# Patient Record
Sex: MALE | Employment: UNEMPLOYED | URBAN - METROPOLITAN AREA
[De-identification: names, ages, dates, MRNs, and addresses within clinical notes are randomized per-mention and may not be internally consistent; named-entity substitution may affect disease eponyms.]

---

## 2021-06-29 ENCOUNTER — TRANSFERRED RECORDS (OUTPATIENT)
Dept: HEALTH INFORMATION MANAGEMENT | Facility: CLINIC | Age: 21
End: 2021-06-29

## 2021-07-14 ENCOUNTER — TRANSFERRED RECORDS (OUTPATIENT)
Dept: HEALTH INFORMATION MANAGEMENT | Facility: CLINIC | Age: 21
End: 2021-07-14

## 2021-07-21 ENCOUNTER — TRANSFERRED RECORDS (OUTPATIENT)
Dept: HEALTH INFORMATION MANAGEMENT | Facility: CLINIC | Age: 21
End: 2021-07-21

## 2021-07-28 ENCOUNTER — TRANSFERRED RECORDS (OUTPATIENT)
Dept: HEALTH INFORMATION MANAGEMENT | Facility: CLINIC | Age: 21
End: 2021-07-28

## 2021-08-18 ENCOUNTER — MEDICAL CORRESPONDENCE (OUTPATIENT)
Dept: HEALTH INFORMATION MANAGEMENT | Facility: CLINIC | Age: 21
End: 2021-08-18

## 2021-08-19 ENCOUNTER — TRANSCRIBE ORDERS (OUTPATIENT)
Dept: OTHER | Age: 21
End: 2021-08-19

## 2021-08-19 DIAGNOSIS — G35 MS (MULTIPLE SCLEROSIS) (H): Primary | ICD-10-CM

## 2021-08-24 NOTE — TELEPHONE ENCOUNTER
RECORDS RECEIVED FROM: External   REASON FOR VISIT: MS   Date of Appt: 9/30/21   NOTES (FOR ALL VISITS) STATUS DETAILS   OFFICE NOTE from referring provider Received Dr Aisha Villatoro @ [facility]:  7/28/21 7/2/21 6/23/21   OFFICE NOTE from other specialist N/A    DISCHARGE SUMMARY from hospital N/A    DISCHARGE REPORT from the ER N/A    OPERATIVE REPORT N/A    MEDICATION LIST Received    IMAGING  (FOR ALL VISITS)     EMG N/A    EEG N/A    LUMBAR PUNCTURE Care Everywhere [facility]:  7/21/21   PIYUSH SCAN N/A    ULTRASOUND (CAROTID BILAT) *VASCULAR* N/A    MRI (HEAD, NECK, SPINE) In process [facility]:  MRI Brain 6/29/21   CT (HEAD, NECK, SPINE) N/A       Action 8/24/21 MV 2.12pm   Action Taken Imaging request faxed to [facility] for:  MRI Brain 6/29/21

## 2021-09-30 ENCOUNTER — PRE VISIT (OUTPATIENT)
Dept: NEUROLOGY | Facility: CLINIC | Age: 21
End: 2021-09-30

## 2023-07-05 ENCOUNTER — TRANSFERRED RECORDS (OUTPATIENT)
Dept: HEALTH INFORMATION MANAGEMENT | Facility: CLINIC | Age: 23
End: 2023-07-05

## 2024-02-16 ENCOUNTER — TRANSFERRED RECORDS (OUTPATIENT)
Dept: HEALTH INFORMATION MANAGEMENT | Facility: CLINIC | Age: 24
End: 2024-02-16
Payer: COMMERCIAL

## 2024-02-16 ENCOUNTER — MEDICAL CORRESPONDENCE (OUTPATIENT)
Dept: HEALTH INFORMATION MANAGEMENT | Facility: CLINIC | Age: 24
End: 2024-02-16
Payer: COMMERCIAL

## 2024-02-21 ENCOUNTER — TRANSCRIBE ORDERS (OUTPATIENT)
Dept: OTHER | Age: 24
End: 2024-02-21

## 2024-02-21 DIAGNOSIS — G35 MULTIPLE SCLEROSIS (H): Primary | ICD-10-CM

## 2024-03-08 NOTE — TELEPHONE ENCOUNTER
Action 3/8/24 MV 12.51pm   Action Taken Imaging request faxed to [facility]     Action 3/13/24 MV 10.16am   Action Taken 2nd request faxed for imaging. Only 6/29/21 image was pushed     Action 3/14/24 MV 12.35pm   Action Taken Images resolved in PACS         RECORDS RECEIVED FROM: external   REASON FOR VISIT: MS   PROVIDER: Dr. Abram Bryson   DATE OF APPT: 3/19/24   NOTES (FOR ALL VISITS) STATUS DETAILS   OFFICE NOTE from referring provider Received Dr Aisha Villatoro @ [facility]:  2/16/24 7/26/22 1/12/22 7/28/21 7/2/21 6/23/21   MEDICATION LIST Received    IMAGING  (FOR ALL VISITS)     MRI (HEAD, NECK, SPINE) PACS [facility]:  MRI Thoracic Spine 7/5/23  MRI Cervical Spine Cervical Spine 7/5/23  MRI Brain 7/5/23  MRI Thoracic Spine 7/15/22  MRI Cervical Spine 7/15/22  MRI Brain 7/15/22  MRI Brain 6/29/21

## 2024-03-19 ENCOUNTER — PRE VISIT (OUTPATIENT)
Dept: NEUROLOGY | Facility: CLINIC | Age: 24
End: 2024-03-19
Payer: COMMERCIAL

## 2024-03-19 ENCOUNTER — OFFICE VISIT (OUTPATIENT)
Dept: NEUROLOGY | Facility: CLINIC | Age: 24
End: 2024-03-19
Attending: PSYCHIATRY & NEUROLOGY
Payer: COMMERCIAL

## 2024-03-19 VITALS
OXYGEN SATURATION: 97 % | DIASTOLIC BLOOD PRESSURE: 91 MMHG | WEIGHT: 177.4 LBS | HEART RATE: 93 BPM | BODY MASS INDEX: 20.53 KG/M2 | SYSTOLIC BLOOD PRESSURE: 161 MMHG | HEIGHT: 78 IN

## 2024-03-19 DIAGNOSIS — G35 MS (MULTIPLE SCLEROSIS) (H): Primary | ICD-10-CM

## 2024-03-19 PROCEDURE — 99214 OFFICE O/P EST MOD 30 MIN: CPT | Performed by: PSYCHIATRY & NEUROLOGY

## 2024-03-19 PROCEDURE — 99417 PROLNG OP E/M EACH 15 MIN: CPT | Performed by: PSYCHIATRY & NEUROLOGY

## 2024-03-19 PROCEDURE — 99205 OFFICE O/P NEW HI 60 MIN: CPT | Performed by: PSYCHIATRY & NEUROLOGY

## 2024-03-19 RX ORDER — GLATIRAMER ACETATE 40 MG/ML
INJECTION, SOLUTION SUBCUTANEOUS
COMMUNITY
Start: 2023-03-29 | End: 2024-07-25

## 2024-03-19 RX ORDER — LEVOTHYROXINE SODIUM 137 UG/1
137 TABLET ORAL
COMMUNITY
Start: 2024-01-23

## 2024-03-19 ASSESSMENT — PAIN SCALES - GENERAL: PAINLEVEL: NO PAIN (0)

## 2024-03-19 NOTE — NURSING NOTE
Chief Complaint   Patient presents with    MS    New Patient     Establishing MS care      Vitals were taken and medications were reconciled.   Cb Boucher, EMT  12:32 PM

## 2024-03-19 NOTE — LETTER
3/19/2024       RE: Pal Minor  [address] [place] [address]     Dear Aisha,    Thank you for referring your patient, Pal Minor, to the [facility] MULTIPLE SCLEROSIS CLINIC [place] at [facility]. Please see a copy of my visit note below.    ID: Pal Minor is a 24-year-old man referred by Dr. Villatoro for neurologic consultation regarding sclerosis.    HPI: Pal had an initial attack of partial transverse myelitis in June 2021.  His left leg became numb and it spread to the trunk and the left arm.  He had workup including MRI of the brain and spine, which showed nonenhancing lesions in the corpus callosum, periventricular, and intermediate white matter as well as the left midbrain, enhancing posterior cord lesion at C3 and nonenhancing lesion at C1-2.  Spinal fluid was positive for intrathecal IgG synthesis.  He was diagnosed with MS by Dr. Villatoro and started on glatiramer acetate.  He has been clinically and radiologically stable on that, but has had some issues with immediate postinjection reactions.  The first 2 times he had some chest tightness and some nausea/vomiting, lasting only a few minutes.  The most recent one about a month ago was more severe, he had prolonged vomiting and some swelling in his lips and face.  He went to the emergency room, symptoms lasted about 90 minutes.  He is also noted in the past that he would get some axillary adenopathy on the same side after a recent abdominal injection.  He does not have any residual symptoms from MS.  Vision cognition and swallowing, sensory motor and bladder function are all doing fine.    No past medical history on file.   MS  Thyroid disease      Current Outpatient Medications:     GLATOPA 40 MG/ML injection, , Disp: , Rfl:     levothyroxine (SYNTHROID/LEVOTHROID) 137 MCG tablet, Take 137 mcg by mouth, Disp: , Rfl:      FH - no MS    SH - single, recently graduated college  "([employer] and [employer] studies), currently weighing job offers ([employer], \"[employer]").  Nonsmoker.    Exam: He is alert and oriented.  Affect bright and negatives are normal.  Cranial nerves are unremarkable.  Muscle bulk tone strength and dexterity are normal in the arms and legs.  Light touch is intact in all 4 limbs.  Finger tapping toe tapping and finger-nose-finger are normal.  Reflexes are normal and symmetric and his gait is narrow-based and stable with normal tandem walk and negative Romberg.    I reviewed medical records including neurology notes from [facility] and MRIs of the brain and spinal cord the results of which extracted above.    Impression: Lacerating multiple sclerosis, stable on glatiramer acetate but with repeated immediate postinjection reactions.  I spent 75 minutes on his care on the date of service including chart review and face-to-face time.  We discussed MS in general, including his typical presentation, course, prognosis and treatment.  We discussed the disease modifying therapies in some detail, including what would be reasonable treatment alternatives.  I told him the immediate postinjection reactions are typically not dangerous, although the facial swelling is a little bit concerning.  He did not have any wheezing or difficulty breathing.  Ultimately he was comfortable continuing to glatiramer acetate and I think that is fine for now as it seems to have done a good job controlling his MS and has an nonparallel safety profile.  He requested to continue his neurologic care here and we are happy to accommodate.    Plan: Follow-up in 6 months with repeat MRI brain.  Contact information was given to him.    Thank you for allow me to participate in his care.    This note was completed in part using voice-recognition software, and some typographic errors may be present as a result.        Again, thank you for allowing me to participate in the care of your patient.  "     Sincerely,    Abram Bryson MD

## 2024-03-22 PROBLEM — G35 MS (MULTIPLE SCLEROSIS) (H): Status: ACTIVE | Noted: 2024-03-22

## 2024-03-22 NOTE — PROGRESS NOTES
"ID: Pal Minor is a 24-year-old man referred by Dr. Villatoro for neurologic consultation regarding sclerosis.    HPI: Pal had an initial attack of partial transverse myelitis in June 2021.  His left leg became numb and it spread to the trunk and the left arm.  He had workup including MRI of the brain and spine, which showed nonenhancing lesions in the corpus callosum, periventricular, and intermediate white matter as well as the left midbrain, enhancing posterior cord lesion at C3 and nonenhancing lesion at C1-2.  Spinal fluid was positive for intrathecal IgG synthesis.  He was diagnosed with MS by Dr. Villatoro and started on glatiramer acetate.  He has been clinically and radiologically stable on that, but has had some issues with immediate postinjection reactions.  The first 2 times he had some chest tightness and some nausea/vomiting, lasting only a few minutes.  The most recent one about a month ago was more severe, he had prolonged vomiting and some swelling in his lips and face.  He went to the emergency room, symptoms lasted about 90 minutes.  He is also noted in the past that he would get some axillary adenopathy on the same side after a recent abdominal injection.  He does not have any residual symptoms from MS.  Vision cognition and swallowing, sensory motor and bladder function are all doing fine.    No past medical history on file.   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  Nonsmoker.    Exam: He is alert and oriented.  Affect bright and negatives are normal.  Cranial nerves are unremarkable.  Muscle bulk tone strength and dexterity are normal in the arms and legs.  Light touch is intact in all 4 " limbs.  Finger tapping toe tapping and finger-nose-finger are normal.  Reflexes are normal and symmetric and his gait is narrow-based and stable with normal tandem walk and negative Romberg.    I reviewed medical records including neurology notes from [facility] and MRIs of the brain and spinal cord the results of which extracted above.    Impression: Relapsing remitting multiple sclerosis, stable on glatiramer acetate but with repeated immediate postinjection reactions.  I spent 75 minutes on his care on the date of service including chart review and face-to-face time.  We discussed MS in general, including his typical presentation, course, prognosis and treatment.  We discussed the disease modifying therapies in some detail, including what would be reasonable treatment alternatives.  I told him the immediate postinjection reactions are typically not dangerous, although the facial swelling is a little bit concerning.  He did not have any wheezing or difficulty breathing.  Ultimately he was comfortable continuing to glatiramer acetate and I think that is fine for now as it seems to have done a good job controlling his MS and has an nonparallel safety profile.  He requested to continue his neurologic care here and we are happy to accommodate.    Plan: Follow-up in 6 months with repeat MRI brain.  Contact information was given to him.    Thank you for allow me to participate in his care.    This note was completed in part using voice-recognition software, and some typographic errors may be present as a result.

## 2024-07-16 ENCOUNTER — OFFICE VISIT (OUTPATIENT)
Dept: NEUROLOGY | Facility: CLINIC | Age: 24
End: 2024-07-16
Attending: PSYCHIATRY & NEUROLOGY
Payer: COMMERCIAL

## 2024-07-16 VITALS
HEART RATE: 80 BPM | BODY MASS INDEX: 19.68 KG/M2 | DIASTOLIC BLOOD PRESSURE: 91 MMHG | SYSTOLIC BLOOD PRESSURE: 163 MMHG | OXYGEN SATURATION: 99 % | WEIGHT: 170.3 LBS

## 2024-07-16 DIAGNOSIS — T50.905A ADVERSE EFFECT OF DRUG, INITIAL ENCOUNTER: ICD-10-CM

## 2024-07-16 DIAGNOSIS — G35 MS (MULTIPLE SCLEROSIS) (H): Primary | ICD-10-CM

## 2024-07-16 PROCEDURE — 99215 OFFICE O/P EST HI 40 MIN: CPT | Performed by: PSYCHIATRY & NEUROLOGY

## 2024-07-16 PROCEDURE — 99214 OFFICE O/P EST MOD 30 MIN: CPT | Performed by: PSYCHIATRY & NEUROLOGY

## 2024-07-16 ASSESSMENT — PAIN SCALES - GENERAL: PAINLEVEL: NO PAIN (0)

## 2024-07-16 NOTE — PROGRESS NOTES
ID: Pal Minor is a 24-year-old man who I have seen once previously in March for multiple sclerosis.  He returns early to discuss immediate postinjection reactions with glatiramer acetate.    HPI: Pal called in June after experiencing yet another atypical postinjection reaction, with nausea and vomiting, facial swelling, and hives.  This again occurred after injecting in the abdomen, and since then he has been limiting the injections to the hips and thighs and has had no further reactions.  His history dates back to an initial attack of partial transverse myelitis in June 2021, with numbness in the left leg arm and trunk.  He has been on glatiramer acetate since that time.  This visit was to discuss potential treatment change.  He has been neurologically stable.  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    No past medical history on file.   MS      Current Outpatient Medications:     cholecalciferol (VITAMIN D3) 125 mcg (5000 units) capsule, Take by mouth daily, Disp: , Rfl:     GLATOPA 40 MG/ML injection, , Disp: , Rfl:     levothyroxine (SYNTHROID/LEVOTHROID) 137 MCG tablet, Take 137 mcg by mouth, Disp: , Rfl:      No exam was done today as the entire visit was spent in counseling and coordination of care.    Impression: Relapsing remitting multiple sclerosis, clinically stable on glatiramer but with repeated atypical and severe postinjection reactions.  I spent 41 minutes on his care today including chart review face-to-face and documentation time.  We reviewed MS immunotherapy options including the interferon beta preparations, teriflunomide, the fumarate preparations, and B-cell depleting agents.  We also discussed what is and is not known about immediate postinjection reactions.  I have no explanation as to why these would be triggered by injections only in the abdomen, but  I have had other patients describe getting such reactions only with injecting into certain sites.  1 option would be just to continue to avoid the abdominal injections.  We also discussed teriflunomide, dimethyl fumarate, and Avonex or Plegridy as reasonable options.  Ultimately he decided to keep going with the glatiramer acetate for now.    Plan: Follow-up in September as previously arranged.    This note was completed in part using voice-recognition software, and some typographic errors may be present as a result.

## 2024-07-16 NOTE — NURSING NOTE
Chief Complaint   Patient presents with    MS    RECHECK     MS follow up      Vitals were taken and medications were reconciled.   Cb Boucher, EMT  11:26 AM

## 2024-07-16 NOTE — LETTER
7/16/2024       RE: Pal Minor  [address] [place] [address]     Dear Colleague,    Thank you for referring your patient, Pal Minor, to the [facility] MULTIPLE SCLEROSIS CLINIC [place] at [facility]. Please see a copy of my visit note below.    ID: Pal Minor is a 24-year-old man who I have seen once previously in March for multiple sclerosis.  He returns early to discuss immediate postinjection reactions with glatiramer acetate.    HPI: Pal called in June after experiencing yet another atypical postinjection reaction, with nausea and vomiting, facial swelling, and hives.  This again occurred after injecting in the abdomen, and since then he has been limiting the injections to the hips and thighs and has had no further reactions.  His history dates back to an initial attack of partial transverse myelitis in June 2021, with numbness in the left leg arm and trunk.  He has been on glatiramer acetate since that time.  This visit was to discuss potential treatment change.  He has been neurologically stable.  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    No past medical history on file.   MS      Current Outpatient Medications:      cholecalciferol (VITAMIN D3) 125 mcg (5000 units) capsule, Take by mouth daily, Disp: , Rfl:      GLATOPA 40 MG/ML injection, , Disp: , Rfl:      levothyroxine (SYNTHROID/LEVOTHROID) 137 MCG tablet, Take 137 mcg by mouth, Disp: , Rfl:      No exam was done today as the entire visit was spent in counseling and coordination of care.    Impression: Relapsing remitting multiple sclerosis, clinically stable on glatiramer but with repeated atypical and severe postinjection reactions.  I spent 41 minutes on his care today including chart review face-to-face and documentation time.  We  reviewed MS immunotherapy options including the interferon beta preparations, teriflunomide, the fumarate preparations, and B-cell depleting agents.  We also discussed what is and is not known about immediate postinjection reactions.  I have no explanation as to why these would be triggered by injections only in the abdomen, but I have had other patients describe getting such reactions only with injecting into certain sites.  1 option would be just to continue to avoid the abdominal injections.  We also discussed teriflunomide, dimethyl fumarate, and Avonex or Plegridy as reasonable options.  Ultimately he decided to keep going with the glatiramer acetate for now.    Plan: Follow-up in September as previously arranged.    This note was completed in part using voice-recognition software, and some typographic errors may be present as a result.       Again, thank you for allowing me to participate in the care of your patient.      Sincerely,    Abram Bryson MD

## 2024-09-10 ENCOUNTER — OFFICE VISIT (OUTPATIENT)
Dept: NEUROLOGY | Facility: CLINIC | Age: 24
End: 2024-09-10
Attending: PSYCHIATRY & NEUROLOGY
Payer: COMMERCIAL

## 2024-09-10 ENCOUNTER — ANCILLARY PROCEDURE (OUTPATIENT)
Dept: MRI IMAGING | Facility: CLINIC | Age: 24
End: 2024-09-10
Attending: PSYCHIATRY & NEUROLOGY
Payer: COMMERCIAL

## 2024-09-10 VITALS
DIASTOLIC BLOOD PRESSURE: 90 MMHG | BODY MASS INDEX: 20.19 KG/M2 | WEIGHT: 174.7 LBS | SYSTOLIC BLOOD PRESSURE: 144 MMHG | HEART RATE: 59 BPM | OXYGEN SATURATION: 97 %

## 2024-09-10 DIAGNOSIS — G35 MS (MULTIPLE SCLEROSIS) (H): ICD-10-CM

## 2024-09-10 DIAGNOSIS — G35 MS (MULTIPLE SCLEROSIS) (H): Primary | ICD-10-CM

## 2024-09-10 PROCEDURE — 99214 OFFICE O/P EST MOD 30 MIN: CPT | Performed by: PSYCHIATRY & NEUROLOGY

## 2024-09-10 PROCEDURE — G2211 COMPLEX E/M VISIT ADD ON: HCPCS | Performed by: PSYCHIATRY & NEUROLOGY

## 2024-09-10 PROCEDURE — A9585 GADOBUTROL INJECTION: HCPCS | Mod: JZ | Performed by: RADIOLOGY

## 2024-09-10 PROCEDURE — 99213 OFFICE O/P EST LOW 20 MIN: CPT | Performed by: PSYCHIATRY & NEUROLOGY

## 2024-09-10 PROCEDURE — 70553 MRI BRAIN STEM W/O & W/DYE: CPT | Performed by: RADIOLOGY

## 2024-09-10 RX ORDER — GADOBUTROL 604.72 MG/ML
7.5 INJECTION INTRAVENOUS ONCE
Status: COMPLETED | OUTPATIENT
Start: 2024-09-10 | End: 2024-09-10

## 2024-09-10 RX ADMIN — GADOBUTROL 7.5 ML: 604.72 INJECTION INTRAVENOUS at 12:25

## 2024-09-10 ASSESSMENT — PAIN SCALES - GENERAL: PAINLEVEL: NO PAIN (0)

## 2024-09-10 NOTE — LETTER
9/10/2024       RE: Pal Minor  [address] [place] [address]     Dear Colleague,    Thank you for referring your patient, Pal Minor, to the [facility] MULTIPLE SCLEROSIS CLINIC [place] at [facility]. Please see a copy of my visit note below.    ID: Pal Minor is a 24-year-old man who I follow for multiple sclerosis.  He returns for routine follow-up and for MRI review.  I last saw him in July of this year.    HPI: Pal has been neurologically stable, with no new symptoms suggestive of interim relapse or disease progression.  He has no residual symptoms from MS.  Onset was in 2021 with numbness of the left limbs and trunk.  He has had issues with atypical immediate postinjection reactions with his glatiramer acetate, typically when he injects in the anterior abdomen.  These involve nausea vomiting and facial swelling as well as the more typical palpitations.  He has not had any more of those since I saw him last, has been injecting in the thighs hips and lateral abdomen and avoiding the anterior stomach.  He is still searching for a job, basically considering the whole world for a location.    We reviewed his MRI of the brain done earlier today and compared it to the prior exam from July 2023.  It again shows a low burden of typical white matter lesions.  I wondered if there might be a new right anterior periventricular lesion (series 2001, image 73), but it is difficult to tell as the current exam is higher resolution and it may have just been skipped over on the prior MRI.  Radiology felt the MRI was stable.  There were no enhancing lesions.    No past medical history on file.   MS      Current Outpatient Medications:      cholecalciferol (VITAMIN D3) 125 mcg (5000 units) capsule, Take by mouth daily, Disp: , Rfl:      GLATOPA 40 MG/ML injection, Inject 40 mg subcutaneously three times a week, Disp: 12 mL, Rfl: 11     levothyroxine  (SYNTHROID/LEVOTHROID) 137 MCG tablet, Take 137 mcg by mouth, Disp: , Rfl:      Exam: He is alert and oriented.  Affect is bright and language functions are normal.  Cranial nerves are unremarkable.  Muscle bulk, tone, strength and dexterity are normal in the arms and legs.  Light touch is intact in all 4 limbs.  Reflexes are normal and symmetric and his gait is narrow-based and stable.    Impression: Relapsing remitting multiple sclerosis, clinically and radiologically stable on glatiramer acetate.  I spent 23 minutes on his care on the date of service including chart review and face-to-face time.  We discussed the possible new lesion, even if it was definitely new I told him I would not consider this an indication for treatment change at this time.  We will continue with clinical and MRI surveillance.  I asked him to let me know if and when he will be moving away for a job and if I am able to I will try to help identify an appropriate provider in his new area.    The longitudinal plan of care for the diagnosis(es)/condition(s) as documented were addressed during this visit. Due to the added complexity in care, I will continue to support Pal in the subsequent management and with ongoing continuity of care.    Plan: Follow-up in 1 year.  Plan to repeat brain MRI in 2 years.    This note was completed in part using voice-recognition software, and some typographic errors may be present as a result.       Again, thank you for allowing me to participate in the care of your patient.      Sincerely,    Abram Bryson MD

## 2024-09-10 NOTE — DISCHARGE INSTRUCTIONS
MRI Contrast Discharge Instructions    The IV contrast you received today will pass out of your body in your  urine. This will happen in the next 24 hours. You will not feel this process.  Your urine will not change color.    Drink at least 4 extra glasses of water or juice today (unless your doctor  has restricted your fluids). This reduces the stress on your kidneys.  You may take your regular medicines.    If you are on dialysis: It is best to have dialysis today.    If you have a reaction: Most reactions happen right away. If you have  any new symptoms after leaving the hospital (such as hives or swelling),  call your hospital at the correct number below. Or call your family doctor.  If you have breathing distress or wheezing, call 911.    Special instructions: ***    I have read and understand the above information.    Signature:______________________________________ Date:___________    Staff:__________________________________________ Date:___________     Time:__________    [place] [facility] Departments:    ___[place]: [phone]  ___[facility]: [phone]  ___[place]: [phone] ___[facility]: [phone]  ___[facility]: [phone]  ___[facility]: [phone]  ___[phone]  ___[facility]: [phone]  ___[phone]

## 2024-09-10 NOTE — NURSING NOTE
Chief Complaint   Patient presents with    MS    RECHECK     6 month follow up      Vitals were taken and medications were reconciled.   Cb Boucher, EMT  1:18 PM

## 2024-09-13 NOTE — PROGRESS NOTES
ID: Pal Minor is a 24-year-old man who I follow for multiple sclerosis.  He returns for routine follow-up and for MRI review.  I last saw him in July of this year.    HPI: Pal has been neurologically stable, with no new symptoms suggestive of interim relapse or disease progression.  He has no residual symptoms from MS.  Onset was in 2021 with numbness of the left limbs and trunk.  He has had issues with atypical immediate postinjection reactions with his glatiramer acetate, typically when he injects in the anterior abdomen.  These involve nausea vomiting and facial swelling as well as the more typical palpitations.  He has not had any more of those since I saw him last, has been injecting in the thighs hips and lateral abdomen and avoiding the anterior stomach.  He is still searching for a job, basically considering the whole world for a location.    We reviewed his MRI of the brain done earlier today and compared it to the prior exam from July 2023.  It again shows a low burden of typical white matter lesions.  I wondered if there might be a new right anterior periventricular lesion (series 2001, image 73), but it is difficult to tell as the current exam is higher resolution and it may have just been skipped over on the prior MRI.  Radiology felt the MRI was stable.  There were no enhancing lesions.    No past medical history on file.   MS      Current Outpatient Medications:     cholecalciferol (VITAMIN D3) 125 mcg (5000 units) capsule, Take by mouth daily, Disp: , Rfl:     GLATOPA 40 MG/ML injection, Inject 40 mg subcutaneously three times a week, Disp: 12 mL, Rfl: 11    levothyroxine (SYNTHROID/LEVOTHROID) 137 MCG tablet, Take 137 mcg by mouth, Disp: , Rfl:      Exam: He is alert and oriented.  Affect is bright and language functions are normal.  Cranial nerves are unremarkable.  Muscle bulk, tone, strength and dexterity are normal in the arms and legs.  Light touch is intact in all 4 limbs.  Reflexes are  normal and symmetric and his gait is narrow-based and stable.    Impression: Relapsing remitting multiple sclerosis, clinically and radiologically stable on glatiramer acetate.  I spent 23 minutes on his care on the date of service including chart review and face-to-face time.  We discussed the possible new lesion, even if it was definitely new I told him I would not consider this an indication for treatment change at this time.  We will continue with clinical and MRI surveillance.  I asked him to let me know if and when he will be moving away for a job and if I am able to I will try to help identify an appropriate provider in his new area.    The longitudinal plan of care for the diagnosis(es)/condition(s) as documented were addressed during this visit. Due to the added complexity in care, I will continue to support Pal in the subsequent management and with ongoing continuity of care.    Plan: Follow-up in 1 year.  Plan to repeat brain MRI in 2 years.    This note was completed in part using voice-recognition software, and some typographic errors may be present as a result.

## 2024-10-22 ENCOUNTER — TELEPHONE (OUTPATIENT)
Dept: NEUROLOGY | Facility: CLINIC | Age: 24
End: 2024-10-22
Payer: COMMERCIAL

## 2024-10-22 NOTE — TELEPHONE ENCOUNTER
Prior Authorization Specialty Medication Request    Medication/Dose: Plegridy subcutaneous pen starter pack (63 mcg on Day 1 and 94 mcg on Day 14), and maintenance rx Plegridy 125 mcg subcutaneous pen (one pen every 14 days)  Diagnosis and ICD code (if different than what is on RX):  Multiple sclerosis, G35  New/renewal/insurance change PA/secondary ins. PA: New  Previously Tried and Failed:  Glatiramer acetate    Important Lab Values:   Rationale: Initiation of alternate disease-modifying therapy for demyelinating disease (multiple sclerosis). Hives, vomiting, facial swelling with glatiramer acetate.    Insurance   Primary: [employer]  Insurance ID:  [id number]    Secondary (if applicable):  Insurance ID:      Pharmacy Information (if different than what is on RX)  Name:    Phone:    Fax:    Clinic Information  Preferred routing pool for dept communication: Multiple Sclerosis Nursing Pool

## 2024-10-22 NOTE — TELEPHONE ENCOUNTER
Prior Authorization Approval    Medication: PLEGRIDY STARTER PACK 63 & 94 MCG/0.5ML SC SOAJ  Authorization Effective Date: 10/22/2024  Authorization Expiration Date: 10/22/2025  Approved Dose/Quantity: 1mL/28 days  Reference #:     Insurance Company: [employer] - Phone [phone] Fax [phone]  Expected CoPay: $    CoPay Card Available:      Financial Assistance Needed:   Which Pharmacy is filling the prescription: [address]  Pharmacy Notified: Yes  Patient Notified: Yes        Thank you,    Kaye Odell [facility]  Specialty Pharmacy Clinic Liaison - CardiologyNeurologyMultiple Sclerosis  [facility] [address] [address]  Ph: [phone] Fax: [phone]  [email or web address]

## 2024-10-24 NOTE — TELEPHONE ENCOUNTER
Plegridy start form signed by pt and placed in Dr Bryson's folder for signature. Pt has MTM visit scheduled for Tuesday 10/29.    Jessica Pantoja RN     [phone]

## 2024-10-29 ENCOUNTER — VIRTUAL VISIT (OUTPATIENT)
Dept: PHARMACY | Facility: CLINIC | Age: 24
End: 2024-10-29
Attending: PSYCHIATRY & NEUROLOGY
Payer: COMMERCIAL

## 2024-10-29 DIAGNOSIS — G35 MS (MULTIPLE SCLEROSIS) (H): Primary | ICD-10-CM

## 2024-10-29 DIAGNOSIS — E03.9 HYPOTHYROIDISM: ICD-10-CM

## 2024-10-29 RX ORDER — PEGINTERFERON BETA-1A 125 UG/.5ML
125 INJECTION, SOLUTION SUBCUTANEOUS
Qty: 1 ML | Refills: 11 | Status: SHIPPED | OUTPATIENT
Start: 2024-10-29

## 2024-10-29 RX ORDER — PEGINTERFERON BETA-1A 63-94 MCG
KIT SUBCUTANEOUS
Qty: 1 ML | Refills: 0 | Status: SHIPPED | OUTPATIENT
Start: 2024-10-29

## 2024-10-29 NOTE — PATIENT INSTRUCTIONS
"Recommendations from today's MTM visit:                                                    Today we reviewed what your medicines are for, how to know if they are working, that your medicines are safe and how to make your medicine regimen as easy as possible.      START Plegridy. Prescriptions for the starter dose and maintenance dosing have been sent to [facility] pharmacy. It looks like your insurance covers auto injectors.   The auto injector is a subcutaneous injection that can be injected into your stomach, upper arm, or thigh. Try to rotate your injection sites  Here is a great website to review when you do your first injection: https://www.plegridyRegalii/en_us/home/starting-plegridy/how-to-take-plegridy.html  First months dosin mcg on day 1 and 94 mcg on day 15 (two weeks apart  Maintenance dosin mcg every 2 weeks (this will start two weeks after the 94 mcg dose)  Possible side effects include injection site reaction, pain where medication is injection, and flu like symptoms   Flu like symptoms: headache, low grade fever, chills, muscle and body aches, and headache   To help with these symptoms you can take ibuprofen (Motrin) 200mg every 6 hours as needed OR naproxen (Aleve) 220mg every 12 hours as needed AND acetaminophen (Tylenol) 325mg every 6 hours as needed  Plegridy should be stored in the refrigerator until it is time to use. You can let the medication warm up for about 30 minutes prior to the injection on your counter   Please purchase a sharps container to dispose of your auto injectors.     Follow-up: 2025 at 11:00 AM via phone call.     It was great speaking with you today.  I value your experience and would be very thankful for your time in providing feedback in our clinic survey. In the next few days, you may receive an email or text message from [employer] with a link to a survey related to your  clinical pharmacist.\"     To schedule another MTM appointment, please call the " clinic directly or you may call the [facility] scheduling line at [phone] or toll-free at [phone].     My Clinical Pharmacist's contact information:                                                      Please feel free to contact me with any questions or concerns you have.      Josiane Altamirano, PharmD, [facility]  Medication Therapy Management Pharmacist   [facility]

## 2024-10-29 NOTE — TELEPHONE ENCOUNTER
Start form faxed to pharmacy liaison. Pt completed MTM visit today and prescriptions were entered to go to [facility].    Jessica Pantoja RN

## 2024-10-29 NOTE — Clinical Note
Dr. Bradyon MCDONNELL [facility] appointment completed today for Plegridy. Prescriptions sent to pharmacy. Thanks!

## 2024-10-29 NOTE — PROGRESS NOTES
Medication Therapy Management (MTM) Encounter    ASSESSMENT:                            Medication Adherence/Access: No issues identified.    MS:   Patient has had reactions to glatiramer acetate and will be switching to another MS disease modifying therapy. Patient may benefit from starting Plegridy. Baseline labs reviewed. All patient's questions were answered. Education provided to the patient on medication dosing, medication administration, storage, and disposal, and potential side effects.      Hypothyroidism   Labs up to date and within normal limits. Patient is stable on current levothyroxine dose, no changes recommended today.     PLAN:                            START Plegridy. Prescriptions for the starter dose and maintenance dosing have been sent to [facility] pharmacy. It looks like your insurance covers auto injectors.   The auto injector is a subcutaneous injection that can be injected into your stomach, upper arm, or thigh. Try to rotate your injection sites  Here is a great website to review when you do your first injection: https://www.[employer]/en_us/home/starting-plegridy/how-to-take-plegridy.html  First months dosin mcg on day 1 and 94 mcg on day 15 (two weeks apart  Maintenance dosin mcg every 2 weeks (this will start two weeks after the 94 mcg dose)  Possible side effects include injection site reaction, pain where medication is injection, and flu like symptoms   Flu like symptoms: headache, low grade fever, chills, muscle and body aches, and headache   To help with these symptoms you can take ibuprofen (Motrin) 200mg every 6 hours as needed OR naproxen (Aleve) 220mg every 12 hours as needed AND acetaminophen (Tylenol) 325mg every 6 hours as needed  Plegridy should be stored in the refrigerator until it is time to use. You can let the medication warm up for about 30 minutes prior to the injection on your counter   Please purchase a sharps container to dispose of your auto  injectors.     Follow-up: 1/28/2025 at 11:00 AM via phone call.     SUBJECTIVE/OBJECTIVE:                          Pal Minor is a 24 year old male seen for an initial visit. He was referred to me from Dr. Bryson.      Reason for visit: Discuss Plegridy .    Allergies/ADRs: Reviewed in chart  Past Medical History: Reviewed in chart  Tobacco: He reports that he has never smoked. He has never used smokeless tobacco.  Alcohol: Less than 1 beverage / month    Medication Adherence/Access: no issues reported.      MS:   - Vitamin D 5000 units once daily      Patient is wanting to know more about Plegridy. He was injecting glatiramer for the past 3 years but started to develop injection site reactions. He was experiencing hives, rash on head, neck and back, nausea, vomiting, facial swelling, and decreased hearing because of ear swelling. He was advised to stop medication and consider starting an alternative agent. After reviewed some of the medications, he decided to start Plegridy. A prior authorization for the medication has already been completed and approved to be filled through [facility] specialty pharmacy.    Baseline screenings:   CBC with differential completed   Hepatic panel completed     Disease Modifying Therapy History:   - glatiramer acetate 2021- October 2024   Last injection was about 2 weeks ago     Hypothyroidism   Levothyroxine 137 mcg daily.   Patient is having the following symptoms: none.   Component  Ref Range & Units 9 mo ago   TSH  0.27 - 4.20 uIU/mL 2.06          Today's Vitals: There were no vitals taken for this visit.  ----------------    I spent 33 minutes with this patient today. All changes were made via collaborative practice agreement with Abram Bryson. A copy of the visit note was provided to the patient's provider(s).    A summary of these recommendations was sent via [employer].    Josiane Altamirano, PharmD, BCACP  Medication Therapy Management Pharmacist   [facility]  Neurology    Telemedicine Visit Details  The patient's medications can be safely assessed via a telemedicine encounter.  Type of service:  Telephone visit  Originating Location (pt. Location): Home  Distant Location (provider location):  Off-site  Start Time: 11:00 AM  End Time: 11:33 AM     Medication Therapy Recommendations  MS (multiple sclerosis) (H)   1 Current Medication: GLATOPA 40 MG/ML injection (Discontinued)   Current Medication Sig: Inject 40 mg subcutaneously three times a week   Rationale: Allergic reaction - Adverse medication event - Safety   Recommendation: Change Medication - Plegridy 125 MCG/0.5ML Sosy   Status: Accepted per CPA   Identified Date: 10/29/2024 Completed Date: 10/29/2024

## 2024-11-12 ENCOUNTER — TELEPHONE (OUTPATIENT)
Dept: NEUROLOGY | Facility: CLINIC | Age: 24
End: 2024-11-12
Payer: COMMERCIAL

## 2024-11-12 NOTE — TELEPHONE ENCOUNTER
PA Initiation    Medication: PLEGRIDY 125 MCG/0.5ML IM SOSY  Insurance Company: [employer] - Phone [phone] Fax [phone]  Pharmacy Filling the Rx: [place], [place] - [address] [place] [address]  Filling Pharmacy Phone:    Filling Pharmacy Fax:    Start Date: 11/12/2024            Thank you,    Kaye Odell [facility]  Specialty Pharmacy Clinic Liaison - CardiologyNeurologyMultiple [facility] [address]  [address] [place] [address]  Ph: [phone] Fax: [phone]  [email or web address]

## 2024-11-19 NOTE — TELEPHONE ENCOUNTER
Prior Authorization Approval    Medication: PLEGRIDY 125 MCG/0.5ML IM SOSY  Authorization Effective Date: 10/22/2024  Authorization Expiration Date: 10/22/2025  Approved Dose/Quantity: 28 days  Reference #:     Insurance Company: [employer] - Phone [phone] Fax [phone]  Expected CoPay: $    CoPay Card Available:      Financial Assistance Needed:   Which Pharmacy is filling the prescription: [address]  Pharmacy Notified: Yes  Patient Notified: Yes          Thank you,    Kaye Odell [facility]  Specialty Pharmacy Clinic Liaison - CardiologyNeurologyMultiple Sclerosis  [facility] [place] [address]  Ph: [phone] Fax: [phone]  [email or web address]

## 2024-12-14 ENCOUNTER — HEALTH MAINTENANCE LETTER (OUTPATIENT)
Age: 24
End: 2024-12-14

## 2024-12-20 ENCOUNTER — TELEPHONE (OUTPATIENT)
Dept: NEUROLOGY | Facility: CLINIC | Age: 24
End: 2024-12-20
Payer: COMMERCIAL

## 2024-12-20 DIAGNOSIS — G35 MS (MULTIPLE SCLEROSIS) (H): ICD-10-CM

## 2024-12-20 NOTE — TELEPHONE ENCOUNTER
PA Initiation    Medication: PLEGRIDY 125 MCG/0.5ML SC SOAJ  Insurance Company: [employer] Prior Auth Dept, phone  [phone], Fax [phone]  Pharmacy Filling the Rx: [facility] #[id number] - [place], [place] - [address]  Filling Pharmacy Phone:    Filling Pharmacy Fax:    Start Date: 12/20/2024       Urgent PA submitted and approved. Awaiting approval letter from [employer] [facility] with effective dates. His preferred specialty pharmacy is [facility].     Thank you,    Kaye Odell [facility]  Specialty Pharmacy Clinic Liaison - CardiologyNeurologyMultLakes Medical Center [facility] [address]  3rd [place] [address]  Ph: [phone] Fax: [phone]  [email or web address]

## 2024-12-20 NOTE — TELEPHONE ENCOUNTER
Prior Authorization Specialty Medication Request    Medication/Dose: Plegridy pen 125 mcg (maintenance dose)  Diagnosis and ICD code (if different than what is on RX):  Multiple sclerosis, G35  New/renewal/insurance change PA/secondary ins. PA: NEW INSURANCE  Previously Tried and Failed:  Glatiramer acetate.     Important Lab Values:   Rationale: Continuation of disease-modifying therapy for demyelinating disease. Currently on Plegridy, has completed starter pack and now due for maintenance dose. New insurance.    Insurance   Primary: [employer]  Insurance ID:  [id number] (Group [id number], policy allen Marlo Darnellbri)    Secondary (if applicable):  Insurance ID:      Pharmacy Information (if different than what is on RX)  Name:  [facility]  Phone:    Fax:    Clinic Information  Preferred routing pool for dept communication: Multiple Sclerosis Nursing Pool

## 2024-12-20 NOTE — TELEPHONE ENCOUNTER
Prior Authorization Approval    Medication: PLEGRIDY 125 MCG/0.5ML SC SOAJ  Authorization Effective Date: 12/20/2024  Authorization Expiration Date: 12/20/2025  Approved Dose/Quantity: 28 days  Reference #: CMM KEY: [id number]   Insurance Company: [employer] Prior Auth Dept, phone  [phone], Fax [phone]  Expected CoPay: $    CoPay Card Available:      Financial Assistance Needed:   Which Pharmacy is filling the prescription: [facility] #[id number] - [address]  Pharmacy Notified: Yes  Patient Notified: Yes          Thank you,    Kaye Odell [facility]  Specialty Pharmacy Clinic Liaison - CardiologyNeurologyMultiple Sclerosis  [facility] [address]  [address] [place] [address]  Ph: [phone] Fax: [phone]  [email or web address]

## 2024-12-23 RX ORDER — PEGINTERFERON BETA-1A 125 UG/.5ML
125 INJECTION, SOLUTION SUBCUTANEOUS
Qty: 1 ML | Refills: 11 | Status: SHIPPED | OUTPATIENT
Start: 2024-12-23 | End: 2024-12-24

## 2024-12-23 NOTE — TELEPHONE ENCOUNTER
Plegridy now to be filled at [facility]. Patient was last seen in September and has follow up appointment in January with Dr. Bryson. Refilled per MS refill protocol.    Floridalma Pride RN

## 2025-01-14 ENCOUNTER — OFFICE VISIT (OUTPATIENT)
Dept: NEUROLOGY | Facility: CLINIC | Age: 25
End: 2025-01-14
Attending: PSYCHIATRY & NEUROLOGY
Payer: COMMERCIAL

## 2025-01-14 VITALS
SYSTOLIC BLOOD PRESSURE: 162 MMHG | OXYGEN SATURATION: 98 % | DIASTOLIC BLOOD PRESSURE: 90 MMHG | WEIGHT: 175.3 LBS | HEART RATE: 78 BPM | BODY MASS INDEX: 20.28 KG/M2 | HEIGHT: 78 IN

## 2025-01-14 DIAGNOSIS — G35 MS (MULTIPLE SCLEROSIS) (H): Primary | ICD-10-CM

## 2025-01-14 ASSESSMENT — PAIN SCALES - GENERAL: PAINLEVEL_OUTOF10: NO PAIN (0)

## 2025-01-14 NOTE — NURSING NOTE
Chief Complaint   Patient presents with    MS    RECHECK     MS follow up      Vitals were taken and medications were reconciled.    Cb Boucher, EMT  12:55 PM

## 2025-01-28 ENCOUNTER — VIRTUAL VISIT (OUTPATIENT)
Dept: PHARMACY | Facility: CLINIC | Age: 25
End: 2025-01-28
Attending: PSYCHIATRY & NEUROLOGY

## 2025-01-28 DIAGNOSIS — G35 MS (MULTIPLE SCLEROSIS) (H): ICD-10-CM

## 2025-01-28 RX ORDER — ACETAMINOPHEN 500 MG
1000 TABLET ORAL EVERY 6 HOURS PRN
COMMUNITY

## 2025-01-28 RX ORDER — PEGINTERFERON BETA-1A 125 UG/.5ML
125 INJECTION, SOLUTION SUBCUTANEOUS
Qty: 1 ML | Refills: 11 | Status: SHIPPED | OUTPATIENT
Start: 2025-01-28

## 2025-01-28 NOTE — PATIENT INSTRUCTIONS
"Recommendations from today's MTM visit:                                                       Continue with Plegridy 125mcg inject subcutaneous every 14 days   Labs on March 11th     Follow-up: 8/1/2025 at 11:00 AM via phone call     It was great speaking with you today.  I value your experience and would be very thankful for your time in providing feedback in our clinic survey. In the next few days, you may receive an email or text message from [employer] with a link to a survey related to your  clinical pharmacist.\"     To schedule another MTM appointment, please call the clinic directly or you may call the MTM scheduling line at [phone] or toll-free at [phone].     My Clinical Pharmacist's contact information:                                                      Please feel free to contact me with any questions or concerns you have.      Josiane Altamirano, PharmD, [facility]  Medication Therapy Management Pharmacist   [facility]     "

## 2025-03-04 DIAGNOSIS — G35 MS (MULTIPLE SCLEROSIS) (H): ICD-10-CM

## 2025-03-04 RX ORDER — PEGINTERFERON BETA-1A 125 UG/.5ML
125 INJECTION, SOLUTION SUBCUTANEOUS
Qty: 1 ML | Refills: 11 | Status: CANCELLED | OUTPATIENT
Start: 2025-03-04

## 2025-03-04 NOTE — TELEPHONE ENCOUNTER
[facility] Pt normally fills with [facility]. Called [facility]. Pt has been approved for one replacement pen through [employer].     Verbal rx provided for one Plegridy pen, with 3 refills, on behalf of Dr Bryson.    Jessica Pantoja RN

## 2025-03-11 ENCOUNTER — LAB (OUTPATIENT)
Dept: LAB | Facility: CLINIC | Age: 25
End: 2025-03-11
Payer: COMMERCIAL

## 2025-03-11 DIAGNOSIS — G35 MS (MULTIPLE SCLEROSIS) (H): ICD-10-CM

## 2025-03-11 LAB
ALT SERPL W P-5'-P-CCNC: 11 U/L (ref 0–70)
AST SERPL W P-5'-P-CCNC: 16 U/L (ref 0–45)
BASOPHILS # BLD AUTO: 0 10E3/UL (ref 0–0.2)
BASOPHILS NFR BLD AUTO: 1 %
EOSINOPHIL # BLD AUTO: 0.3 10E3/UL (ref 0–0.7)
EOSINOPHIL NFR BLD AUTO: 5 %
ERYTHROCYTE [DISTWIDTH] IN BLOOD BY AUTOMATED COUNT: 12.3 % (ref 10–15)
HCT VFR BLD AUTO: 47 % (ref 40–53)
HGB BLD-MCNC: 15.3 G/DL (ref 13.3–17.7)
IMM GRANULOCYTES # BLD: 0 10E3/UL
IMM GRANULOCYTES NFR BLD: 0 %
LYMPHOCYTES # BLD AUTO: 2.1 10E3/UL (ref 0.8–5.3)
LYMPHOCYTES NFR BLD AUTO: 39 %
MCH RBC QN AUTO: 27.5 PG (ref 26.5–33)
MCHC RBC AUTO-ENTMCNC: 32.6 G/DL (ref 31.5–36.5)
MCV RBC AUTO: 85 FL (ref 78–100)
MONOCYTES # BLD AUTO: 0.4 10E3/UL (ref 0–1.3)
MONOCYTES NFR BLD AUTO: 8 %
NEUTROPHILS # BLD AUTO: 2.5 10E3/UL (ref 1.6–8.3)
NEUTROPHILS NFR BLD AUTO: 47 %
NRBC # BLD AUTO: 0 10E3/UL
NRBC BLD AUTO-RTO: 0 /100
PLATELET # BLD AUTO: 154 10E3/UL (ref 150–450)
RBC # BLD AUTO: 5.56 10E6/UL (ref 4.4–5.9)
TSH SERPL DL<=0.005 MIU/L-ACNC: 1.53 UIU/ML (ref 0.3–4.2)
VIT D+METAB SERPL-MCNC: 51 NG/ML (ref 20–50)
WBC # BLD AUTO: 5.2 10E3/UL (ref 4–11)

## 2025-03-11 PROCEDURE — 82306 VITAMIN D 25 HYDROXY: CPT | Performed by: PSYCHIATRY & NEUROLOGY

## 2025-03-11 PROCEDURE — 84460 ALANINE AMINO (ALT) (SGPT): CPT | Performed by: PATHOLOGY

## 2025-03-11 PROCEDURE — 36415 COLL VENOUS BLD VENIPUNCTURE: CPT | Performed by: PATHOLOGY

## 2025-03-11 PROCEDURE — 99000 SPECIMEN HANDLING OFFICE-LAB: CPT | Performed by: PATHOLOGY

## 2025-03-11 PROCEDURE — 84443 ASSAY THYROID STIM HORMONE: CPT | Performed by: PATHOLOGY

## 2025-03-11 PROCEDURE — 84450 TRANSFERASE (AST) (SGOT): CPT | Performed by: PATHOLOGY

## 2025-03-11 PROCEDURE — 85025 COMPLETE CBC W/AUTO DIFF WBC: CPT | Performed by: PATHOLOGY
